# Patient Record
Sex: MALE | Race: BLACK OR AFRICAN AMERICAN | Employment: FULL TIME | URBAN - METROPOLITAN AREA
[De-identification: names, ages, dates, MRNs, and addresses within clinical notes are randomized per-mention and may not be internally consistent; named-entity substitution may affect disease eponyms.]

---

## 2017-12-06 ENCOUNTER — OFFICE VISIT (OUTPATIENT)
Dept: ENDOCRINOLOGY | Age: 52
End: 2017-12-06

## 2017-12-06 VITALS
DIASTOLIC BLOOD PRESSURE: 84 MMHG | BODY MASS INDEX: 20.81 KG/M2 | SYSTOLIC BLOOD PRESSURE: 137 MMHG | HEIGHT: 67 IN | HEART RATE: 62 BPM | WEIGHT: 132.6 LBS

## 2017-12-06 DIAGNOSIS — R63.4 WEIGHT LOSS: Primary | ICD-10-CM

## 2017-12-06 NOTE — PROGRESS NOTES
Chief Complaint   Patient presents with    Thyroid Problem     pcp and pharmacy verified   Records reviewed  History of Present Illness: Lulú Mann is a 46 y.o. male who I was asked to see in consult by his PCP Dr. Noelle Nowak, for evaluation of \"thyroid problems\". Will request records and labs from PCP. Pt notes that he has been having issues of weight loss for the last 3 months. He has lost 10-15 pounds in the last 3 months and Dr. Kiki Mauricio is concerned he could have hyperthyroidism. Pt notes that \"everybody in my family have thyroid problems\". Pt is not aware if he has ever had any issues of thyroid. Pt notes that he has had protuding eyes \"all my life\" and about 30 years ago he was set up to have evaluation by Opthalmology at [facility], but he never kept the appointments. Pt notes that when he gets overactive he can \"feel his heart race\" and pt note he gets \"out of breath very easily\". He does note that he will occasionally have issues of tremors. He denies issues of heat or cold intolerance, he does note frequent issues of diarrhea. He also notes that he has been having some issues of lightheadedness and dizziness at work. He has not had any syncope, but he has had to rest more. He notes he has these symptoms when he is physically active. He notes that he had a colonoscopy in October 2017. [facility] doctor told me it looked good\". He denies starting any new medications, stopping any medications, hospitalizations, traumas, known exposures. He does not note any changes in his diet. Past Medical History:   Diagnosis Date    Other ill-defined conditions(979.89)     thyroid problem     Past Surgical History:   Procedure Laterality Date    HX WISDOM TEETH EXTRACTION       No current outpatient prescriptions on file. No current facility-administered medications for this visit.       No Known Allergies  Family History   Problem Relation Age of Onset    Heart Attack Mother     Thyroid Disease Mother    [address] [facility] [place] Heart Disease Mother     Thyroid Disease Father     Heart Attack Father     Heart Disease Father     No Known Problems Sister     No Known Problems Brother     No Known Problems Sister     Cancer Neg Hx      Social History     Social History    Marital status: UNKNOWN     Spouse name: N/A    Number of children: N/A    Years of education: N/A     Occupational History    Not on file. Social History Main Topics    Smoking status: Passive Smoke Exposure - Never Smoker    Smokeless tobacco: Never Used    Alcohol use Yes      Comment: 1/2 pint on the weekend    Drug use: No    Sexual activity: Not on file     Other Topics Concern    Not on file     Social History Narrative     Review of Systems:  - Constitutional Symptoms: no fevers, chills, + weight loss  - Eyes: no blurry vision or double vision  - Cardiovascular: occasional palpitations  - Respiratory: +  shortness of breath  - Gastrointestinal: no dysphagia or abdominal pain  - Musculoskeletal: no joint pains or weakness  - Integumentary: no rashes  - Neurological: no numbness, tingling, or headaches  - Psychiatric: no depression or anxiety  - Endocrine: no heat or cold intolerance, no polyuria or polydipsia    Physical Examination:  Blood pressure 137/84, pulse 62, height 5' 7\" (1.702 m), weight 132 lb 9.6 oz (60.1 kg).   - General: pleasant, no distress, good eye contact  - HEENT: + exopthalmos, no periorbital edema, no scleral/conjunctival injection, EOMI, no lid lag   - Neck: supple, no thyromegaly, masses, lymph nodes, or carotid bruits, no supraclavicular or dorsocervical fat pads  - Cardiovascular: regular, normal rate, normal S1 and S2, no murmurs/rubs/gallops   - Respiratory: clear to auscultation bilaterally  - Gastrointestinal: soft, nontender, nondistended, no masses, no hepatosplenomegaly  - Musculoskeletal: no proximal muscle weakness in upper or lower extremities  - Integumentary: no acanthosis nigricans, no abdominal striae, no rashes, no edema  - Neurological: reflexes 2+ at biceps, no tremor  - Psychiatric: normal mood and affect    Data Reviewed:   5/12/17  TSH 2.800  FT4 1.26  FT3 3.3    1/13/17  TRAb <0.50    Assessment/Plan:   1. Weight loss    1) From an endocrine standpoint the DDx for weight loss is wide. He had normal TFTs in May, but will recheck TSH, FT4, TT3 and will check a thyroid Ab panel. With his symptoms of fatigue and lightheadedness when he gets hot makes me concerned about possible adrenal insufficiency. Will start with a fasting AM Cortisol and ACTH. The polyuria and polydipsia as well as weight loss are concerning for possible early onset of diabetes, will check an A1C. To rule out pheochomocytoma, will check a plasma metanephrine level. Will also check a CMP and CBC. Pt voices understanding and agreement with the plan. RTC 3 months      Follow-up Disposition:  Return in about 3 months (around 3/6/2018).     Copy sent to:  Dr. Cindy Melgar

## 2017-12-06 NOTE — LETTER
12/6/2017 1:34 PM 
 
Patient:  Faustina Loving YOB: 1965 Date of Visit: 12/6/2017 Dear Venkata Allen MD 
[address] [place] [id number] VIA Facsimile: [phone] 
 : Thank you for referring Mr. Faustina Loving to me for evaluation/treatment. Below are the relevant portions of my assessment and plan of care. If you have questions, please do not hesitate to call me. I look forward to following Mr. Issa Santiago along with you. Sincerely, Alveda Dakin, MD

## 2017-12-06 NOTE — MR AVS SNAPSHOT
Visit Information Date & Time Provider Department Dept. Phone Encounter #  
 12/6/2017  1:30 PM Zoila Phillips, [address] [place] [address] Diabetes and Endocrinology [phone] [id number] Follow-up Instructions Return in about 3 months (around 3/6/2018). Upcoming Health Maintenance Date Due Hepatitis C Screening 1965 DTaP/Tdap/Td series (1 - Tdap) 12/24/1986 FOBT Q 1 YEAR AGE 50-75 12/24/2015 Influenza Age 5 to Adult 8/1/2017 Allergies as of 12/6/2017  Review Complete On: 12/6/2017 By: Zoila Phillips MD  
 No Known Allergies Current Immunizations  Never Reviewed No immunizations on file. Not reviewed this visit You Were Diagnosed With   
  
 Codes Comments Weight loss    -  Primary ICD-10-CM: R63.4 ICD-9-CM: 783.21 Vitals BP Pulse Height(growth percentile) Weight(growth percentile) BMI Smoking Status 137/84 62 5' 7\" (1.702 m) 132 lb 9.6 oz (60.1 kg) 20.77 kg/m2 Passive Smoke Exposure - Never Smoker Vitals History BMI and BSA Data Body Mass Index Body Surface Area 20.77 kg/m 2 1.69 m 2 Preferred Pharmacy Pharmacy Name Phone [facility] [phone] Your Updated Medication List  
  
Notice  As of 12/6/2017  2:04 PM  
 You have not been prescribed any medications. We Performed the Following ACTH [id number] CPT(R)] CBC W/O DIFF [66872 CPT(R)] CORTISOL, AM [id number] CPT(R)] HEMOGLOBIN A1C WITH EAG [35760 CPT(R)] METABOLIC PANEL, COMPREHENSIVE [93730 CPT(R)] METANEPHRINES, PLASMA [84055 CPT(R)]   
 T3 TOTAL [43675 CPT(R)] T4, FREE [id number] CPT(R)] THYROID ANTIBODY PANEL [54484 CPT(R)] TSH 3RD GENERATION [56040 CPT(R)] Follow-up Instructions Return in about 3 months (around 3/6/2018). Introducing [facility]!    
 Kristin Aranda introduces [employer] patient portal. Now you can access parts of your medical record, email your doctor's office, and request medication refills online. 1. In your internet browser, go to [email or web address]. [email or web address] 2. Click on the First Time User? Click Here link in the Sign In box. You will see the New Member Sign Up page. 3. Enter your [employer] Access Code exactly as it appears below. You will not need to use this code after youve completed the sign-up process. If you do not sign up before the expiration date, you must request a new code. · [employer] Access Code: [email or web address] Expires: 3/6/2018  1:02 PM 
 
4. Enter the last four digits of your Social Security Number (xxxx) and Date of Birth (mm/dd/yyyy) as indicated and click Submit. You will be taken to the next sign-up page. 5. Create a [employer] ID. This will be your [employer] login ID and cannot be changed, so think of one that is secure and easy to remember. 6. Create a [employer] password. You can change your password at any time. 7. Enter your Password Reset Question and Answer. This can be used at a later time if you forget your password. 8. Enter your e-mail address. You will receive e-mail notification when new information is available in [address] 9. Click Sign Up. You can now view and download portions of your medical record. 10. Click the Download Summary menu link to download a portable copy of your medical information. If you have questions, please visit the Frequently Asked Questions section of the [employer] website. Remember, [employer] is NOT to be used for urgent needs. For medical emergencies, dial 911. Now available from your iPhone and Android! Please provide this summary of care documentation to your next provider. Your primary care clinician is listed as Elin Correa. If you have any questions after today's visit, please call [phone].

## 2018-10-31 ENCOUNTER — OFFICE VISIT (OUTPATIENT)
Dept: ENDOCRINOLOGY | Age: 53
End: 2018-10-31

## 2018-10-31 VITALS
HEIGHT: 67 IN | SYSTOLIC BLOOD PRESSURE: 135 MMHG | WEIGHT: 131.6 LBS | DIASTOLIC BLOOD PRESSURE: 79 MMHG | HEART RATE: 51 BPM | BODY MASS INDEX: 20.65 KG/M2

## 2018-10-31 DIAGNOSIS — R63.4 WEIGHT LOSS: Primary | ICD-10-CM

## 2018-10-31 NOTE — PROGRESS NOTES
Chief Complaint   Patient presents with    Thyroid Problem     No PCP. Pharmacy verified   Records since last visit reviewed. History of Present Illness: Tyler Kirkpatrick is a 46 y.o. male here for follow up of weight loss. At our initial visit in December 2017 pt reported that he had been having issues of weight loss since September 2017. His PCP was concerned he could have hyperthyroidism because \"everybody in my family has thyroid problems\". He notes that he had a colonoscopy in October 2017. [facility] doctor told me it looked good\". He denied starting any new medications, stopping any medications, hospitalizations, traumas, known exposures. He did not note any changes in his diet. I gave pt orders to evaluate for endocrine causes of his weight loss, but he never had these labs drawn and has not followed up with me since December 2017. Pt notes he has changed employers and got new insurance, which is why he has not been back since December 2017. Pt notes he is continuing to have issues of weight loss \"it varies up and down\". Pt notes that he has had protuding eyes \"all my life\" and about 30 years ago he was set up to have evaluation by Opthalmology at [facility], but he never kept the appointments. Pt notes that he continues to have issues of \"heart racing\" and pt note he gets \"out of breath very easily\". He does note that he will occasionally have issues of tremors. He denies issues of heat or cold intolerance, he does note frequent issues of diarrhea. He also notes that he has been having some issues of lightheadedness and dizziness at work [employer] now and then\". He has not had any syncope. He notes he has these symptoms when he is physically active. 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 He notes his symptoms have been unchanged since our last visit, they have not worsened or progressed.   He denies starting any new medications, stopping any medications, hospitalizations, traumas, known exposures. He denies any changes in his diet. No current outpatient medications on file. No current facility-administered medications for this visit. No Known Allergies  Review of Systems:  - Cardiovascular: no chest pain  - Neurological: no tremors  - Integumentary: skin is normal    Physical Examination:  Blood pressure 135/79, pulse (!) 51, height 5' 7\" (1.702 m), weight 131 lb 9.6 oz (59.7 kg). - General: pleasant, no distress, good eye contact  - HEENT: + exopthalmos, no periorbital edema, no scleral/conjunctival injection, EOMI, no lid lag   - Neck: supple, no thyromegaly, masses, lymph nodes, or carotid bruits, no supraclavicular or dorsocervical fat pads  - Cardiovascular: regular, normal rate, normal S1 and S2, no murmurs/rubs/gallops   - Respiratory: clear to auscultation bilaterally  - Gastrointestinal: soft, nontender, nondistended, no masses, no hepatosplenomegaly  - Musculoskeletal: no proximal muscle weakness in upper or lower extremities  - Integumentary: no acanthosis nigricans, no abdominal striae, no rashes, no edema  - Neurological: reflexes 2+ at biceps, no tremor  - Psychiatric: normal mood and affect    Data Reviewed:   - none new for review    Assessment/Plan:   1) Weight loss > Pt's symptoms are unchanged, so I will start with checking TSH, FT4, TT3, TSI, TRAb, Thyroid Ab panel, CBC, CMP and A1C today. If these labs come back normal then we can test Adrenal function ,Metanephrine level and IGF-1 to look for other, more rare endocrine causes for weight loss. Pt voices understanding and agreement with the plan. RTC 3 months      Follow-up Disposition:  Return in about 3 months (around 1/31/2019).

## 2018-10-31 NOTE — LETTER
11/2/2018 11:27 AM 
 
Mr. Rosita Still [address] [id number] Dear Rosita Still: 
 
I attempted to call you at the phone number provided, but was unable to get you, or leave a voice mail. Resulted Orders TSH 3RD GENERATION Result Value Ref Range TSH 2.020 0.450 - 4.500 uIU/mL T4, FREE Result Value Ref Range T4, Free 1.17 0.82 - 1.77 ng/dL  
T3 TOTAL Result Value Ref Range T3, total 109 71 - 180 ng/dL CBC W/O DIFF Result Value Ref Range WBC 6.4 3.4 - 10.8 x10E3/uL  
 RBC 4.86 4.14 - 5.80 x10E6/uL HGB 15.3 13.0 - 17.7 g/dL HCT 46.0 37.5 - 51.0 % MCV 95 79 - 97 fL  
 MCH 31.5 26.6 - 33.0 pg  
 MCHC 33.3 31.5 - 35.7 g/dL  
 RDW 13.7 12.3 - 15.4 % PLATELET 128 397 - 160 x10E3/uL METABOLIC PANEL, COMPREHENSIVE Result Value Ref Range Glucose 92 65 - 99 mg/dL BUN 13 6 - 24 mg/dL Creatinine 0.83 0.76 - 1.27 mg/dL GFR est non- >59 mL/min/1.73 GFR est  >59 mL/min/1.73  
 BUN/Creatinine ratio 16 9 - 20 Sodium 139 134 - 144 mmol/L Potassium 4.5 3.5 - 5.2 mmol/L Chloride 98 96 - 106 mmol/L  
 CO2 26 20 - 29 mmol/L Calcium 9.6 8.7 - 10.2 mg/dL Protein, total 7.4 6.0 - 8.5 g/dL Albumin 4.7 3.5 - 5.5 g/dL GLOBULIN, TOTAL 2.7 1.5 - 4.5 g/dL A-G Ratio 1.7 1.2 - 2.2 Bilirubin, total 0.7 0.0 - 1.2 mg/dL Alk. phosphatase 55 39 - 117 IU/L  
 AST (SGOT) 18 0 - 40 IU/L  
 ALT (SGPT) 17 0 - 44 IU/L THYROID STIMULATING IMMUNOGLOBULIN Result Value Ref Range Thyroid Stim Immunoglobulin <0.10 0.00 - 0.55 IU/L  
TSH RECEPTOR AB Result Value Ref Range Thyrotropin Receptor Ab, serum <0.50 0.00 - 1.75 IU/L THYROID ANTIBODY PANEL Result Value Ref Range Thyroid peroxidase Ab 12 0 - 34 IU/mL Thyroglobulin Ab <1.0 0.0 - 0.9 IU/mL HEMOGLOBIN A1C WITH EAG Result Value Ref Range Hemoglobin A1c 5.3 4.8 - 5.6 % Estimated average glucose 105 mg/dL 1) Your thyroid levels are normal. 
 2) Your kidney and liver function tests are normal and look good. 3) You do not have any evidence of diabetes. Since all of these labs are normal, I am including lab orders for other tests to see if we can figure out why you have been losing weight. You can take these to [facility] and have them drawn. Please call me if you have any questions: [phone] Sincerely, Jeannette Jackson MD

## 2018-11-01 LAB
ALBUMIN SERPL-MCNC: 4.7 G/DL (ref 3.5–5.5)
ALBUMIN/GLOB SERPL: 1.7 {RATIO} (ref 1.2–2.2)
ALP SERPL-CCNC: 55 IU/L (ref 39–117)
ALT SERPL-CCNC: 17 IU/L (ref 0–44)
AST SERPL-CCNC: 18 IU/L (ref 0–40)
BILIRUB SERPL-MCNC: 0.7 MG/DL (ref 0–1.2)
BUN SERPL-MCNC: 13 MG/DL (ref 6–24)
BUN/CREAT SERPL: 16 (ref 9–20)
CALCIUM SERPL-MCNC: 9.6 MG/DL (ref 8.7–10.2)
CHLORIDE SERPL-SCNC: 98 MMOL/L (ref 96–106)
CO2 SERPL-SCNC: 26 MMOL/L (ref 20–29)
CREAT SERPL-MCNC: 0.83 MG/DL (ref 0.76–1.27)
ERYTHROCYTE [DISTWIDTH] IN BLOOD BY AUTOMATED COUNT: 13.7 % (ref 12.3–15.4)
EST. AVERAGE GLUCOSE BLD GHB EST-MCNC: 105 MG/DL
GLOBULIN SER CALC-MCNC: 2.7 G/DL (ref 1.5–4.5)
GLUCOSE SERPL-MCNC: 92 MG/DL (ref 65–99)
HBA1C MFR BLD: 5.3 % (ref 4.8–5.6)
HCT VFR BLD AUTO: 46 % (ref 37.5–51)
HGB BLD-MCNC: 15.3 G/DL (ref 13–17.7)
MCH RBC QN AUTO: 31.5 PG (ref 26.6–33)
MCHC RBC AUTO-ENTMCNC: 33.3 G/DL (ref 31.5–35.7)
MCV RBC AUTO: 95 FL (ref 79–97)
PLATELET # BLD AUTO: 249 X10E3/UL (ref 150–379)
POTASSIUM SERPL-SCNC: 4.5 MMOL/L (ref 3.5–5.2)
PROT SERPL-MCNC: 7.4 G/DL (ref 6–8.5)
RBC # BLD AUTO: 4.86 X10E6/UL (ref 4.14–5.8)
SODIUM SERPL-SCNC: 139 MMOL/L (ref 134–144)
T3 SERPL-MCNC: 109 NG/DL (ref 71–180)
T4 FREE SERPL-MCNC: 1.17 NG/DL (ref 0.82–1.77)
THYROGLOB AB SERPL-ACNC: <1 IU/ML (ref 0–0.9)
THYROPEROXIDASE AB SERPL-ACNC: 12 IU/ML (ref 0–34)
TSH RECEP AB SER-ACNC: <0.5 IU/L (ref 0–1.75)
TSH SERPL DL<=0.005 MIU/L-ACNC: 2.02 UIU/ML (ref 0.45–4.5)
TSI ACT/NOR SER: <0.1 IU/L (ref 0–0.55)
WBC # BLD AUTO: 6.4 X10E3/UL (ref 3.4–10.8)

## 2018-11-02 DIAGNOSIS — R63.4 WEIGHT LOSS: Primary | ICD-10-CM

## 2018-11-02 DIAGNOSIS — R61 HYPERHIDROSIS: ICD-10-CM

## 2019-02-15 ENCOUNTER — OFFICE VISIT (OUTPATIENT)
Dept: ENDOCRINOLOGY | Age: 54
End: 2019-02-15

## 2019-02-15 VITALS
HEART RATE: 65 BPM | HEIGHT: 67 IN | DIASTOLIC BLOOD PRESSURE: 89 MMHG | SYSTOLIC BLOOD PRESSURE: 122 MMHG | BODY MASS INDEX: 20.25 KG/M2 | OXYGEN SATURATION: 99 % | WEIGHT: 129 LBS

## 2019-02-15 DIAGNOSIS — R63.4 WEIGHT LOSS: Primary | ICD-10-CM

## 2019-02-15 NOTE — PROGRESS NOTES
Chief Complaint Patient presents with  Follow-up Records since last visit reviewed. History of Present Illness: Nimco Sanders is a 48 y.o. male here for follow up of weight loss. At our last visit in October 2018 his weight was stable at 131 pounds. He had TFTs, CBC and CMP drawn and the came back normal. I sent him for metanephrine levels, which he did not have drawn. His weight today was 129 pounds. At our initial visit in December 2017 pt reported that he had been having issues of weight loss since September 2017. His PCP was concerned he could have hyperthyroidism because \"everybody in my family has thyroid problems\". He notes that he had a colonoscopy in October 2017. [facility] doctor told me it looked good\". Pt notes his weight has been stable. His only medication he takes on a daily basis is ASA. Pt notes that he has had protuding eyes \"all my life\" and about 30 years ago he was set up to have evaluation by Opthalmology at [facility]. Raghu Hall gave me a number for an eye doctor in [place], but they never got back with me\". Pt notes that he continues to have issues of \"heart racing\" and pt note he gets \"out of breath very easily\". He does note that he will occasionally have issues of tremors. He denies issues of heat or cold intolerance, he does note frequent issues of diarrhea. He also notes that he has been having some issues of lightheadedness and dizziness at work [employer] now and then\". He has not had any syncope. He notes he has these symptoms when he is physically active. 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 He notes his symptoms have been unchanged since our last visit, they have not worsened or progressed. He denies starting any new medications, stopping any medications, hospitalizations, traumas, known exposures. He denies any changes in his diet. No current outpatient medications on file. No current facility-administered medications for this visit. No Known Allergies Review of Systems: 
- Cardiovascular: no chest pain 
- Neurological: no tremors - Integumentary: skin is normal 
 
Physical Examination: 
Blood pressure 122/89, pulse 65, height 5' 7\" (1.702 m), weight 129 lb (58.5 kg), SpO2 99 %. - General: pleasant, no distress, good eye contact 
- HEENT: + exopthalmos, no periorbital edema, no scleral/conjunctival injection, EOMI, no lid lag  
- Neck: supple, no thyromegaly, masses, lymph nodes, or carotid bruits, no supraclavicular or dorsocervical fat pads - Cardiovascular: regular, normal rate, normal S1 and S2, no murmurs/rubs/gallops - Respiratory: clear to auscultation bilaterally - Gastrointestinal: soft, nontender, nondistended, no masses, no hepatosplenomegaly - Musculoskeletal: no proximal muscle weakness in upper or lower extremities - Integumentary: no acanthosis nigricans, no abdominal striae, no rashes, no edema 
- Neurological: reflexes 2+ at biceps, no tremor - Psychiatric: normal mood and affect Data Reviewed:  
- none new for review Assessment/Plan:  
1) Weight loss > Pt's weight is stable. He still has the issues of BARCENAS and lightheadedness. I can not get him to go to [facility] for lab testing, so I am going to send him to the infusion center for a reyna stim test to rule out adrenal insufficiency and will have them draw the metanephrine levels at that time. Pt voices understanding and agreement with the plan. Pt voices understanding and agreement with the plan. RTC based on the above results

## 2019-03-15 ENCOUNTER — HOSPITAL ENCOUNTER (OUTPATIENT)
Dept: INFUSION THERAPY | Age: 54
Discharge: HOME | End: 2019-03-15

## 2019-03-25 ENCOUNTER — TELEPHONE (OUTPATIENT)
Dept: ENDOCRINOLOGY | Age: 54
End: 2019-03-25

## 2019-03-25 NOTE — TELEPHONE ENCOUNTER
Matty Calderón MD  [place], [place]             Mr. Milad Joel had an appointment with the infusion center on 3/21/19. He did not keep the appointment. Can you give him the number to the infusion center and have him call to reschedule.      Thanks,     Nonah Barrier

## 2019-06-03 NOTE — LETTER
Return to WORK    Lauren 3, 2019      Re:   Issa Garcias  [address] [address]           This is to certify that Issa Garcias was seen in Urgent Care and can return to WORK on 6/4/2019.          SIGNATURE: ___________________________________________,   6/3/2019  César Mauricio, CAROLE Mauricio, CAROLE  [facility]  [place]  [address] [facility] [address]  [phone]   3/28/2019 3:05 PM 
 
Mr. Cristiano Obrien[address] [phone] Dear  Leon Dorys, The infusion center notified Josselyn Urbina that you missed your appointment with them on 03/21/19. I tried calling your telephone number several times. The machine says that you do not have a voicemail that has been set up. I tried again today and the telephone was busy. Please call the infusion center and reschedule your appointment. The scheduling telephone number is [phone]. Thank you for your prompt attention to this matter. If you have any questions, please feel free to call us at [phone]. Sincerely, Monika Jameson LPN